# Patient Record
Sex: MALE | URBAN - METROPOLITAN AREA
[De-identification: names, ages, dates, MRNs, and addresses within clinical notes are randomized per-mention and may not be internally consistent; named-entity substitution may affect disease eponyms.]

---

## 2019-07-29 ENCOUNTER — OFFICE VISIT (OUTPATIENT)
Dept: FAMILY MEDICINE | Facility: CLINIC | Age: 44
End: 2019-07-29

## 2019-07-29 VITALS
RESPIRATION RATE: 20 BRPM | WEIGHT: 152 LBS | BODY MASS INDEX: 25.33 KG/M2 | HEART RATE: 78 BPM | TEMPERATURE: 97.9 F | OXYGEN SATURATION: 98 % | DIASTOLIC BLOOD PRESSURE: 100 MMHG | SYSTOLIC BLOOD PRESSURE: 158 MMHG | HEIGHT: 65 IN

## 2019-07-29 DIAGNOSIS — Z12.5 SCREENING FOR PROSTATE CANCER: Primary | ICD-10-CM

## 2019-07-29 DIAGNOSIS — R03.0 ELEVATED BLOOD PRESSURE READING WITHOUT DIAGNOSIS OF HYPERTENSION: ICD-10-CM

## 2019-07-29 PROCEDURE — 36415 COLL VENOUS BLD VENIPUNCTURE: CPT | Performed by: FAMILY MEDICINE

## 2019-07-29 PROCEDURE — 99203 OFFICE O/P NEW LOW 30 MIN: CPT | Performed by: FAMILY MEDICINE

## 2019-07-29 PROCEDURE — G0103 PSA SCREENING: HCPCS | Performed by: FAMILY MEDICINE

## 2019-07-29 SDOH — HEALTH STABILITY: MENTAL HEALTH: HOW OFTEN DO YOU HAVE A DRINK CONTAINING ALCOHOL?: NEVER

## 2019-07-29 ASSESSMENT — MIFFLIN-ST. JEOR: SCORE: 1501.35

## 2019-07-29 NOTE — PATIENT INSTRUCTIONS
The patient would like his labs   sent by email to himself and to the [email or web address] site.  Emails are:[email or web address] and [email or web address].    Patient was instructed on salt restriction.  He will get his blood pressure checked again in 1 month.  He had it checked at [facility] and [employer] in the last few days and both of those were slightly elevated also.    Patient is just visiting.  His wife is here on a permanent visa.  He comes over numerous times just for visits.  [place] on [place].

## 2019-07-29 NOTE — PROGRESS NOTES
"Subjective     Arturo Mireles is a 44 year old male who presents to clinic today for the following health issues:    HPI   New Patient/Transfer of Care, and requesting PSA test today.        There is no problem list on file for this patient.    History reviewed. No pertinent surgical history.    Social History     Tobacco Use     Smoking status: Never Smoker     Smokeless tobacco: Never Used   Substance Use Topics     Alcohol use: Never     Frequency: Never     Family History   Problem Relation Age of Onset     Arthritis Mother      Diabetes Father              Reviewed and updated as needed this visit by Provider  Tobacco         Review of Systems   ROS COMP: Constitutional, HEENT, cardiovascular, pulmonary, gi and gu systems are negative, except as otherwise noted.      Objective    BP (!) (P) 140/100   Pulse 78   Temp 97.9  F (36.6  C)   Resp 20   Ht 1.651 m (5' 5\")   Wt 68.9 kg (152 lb)   SpO2 98%   BMI 25.29 kg/m    Body mass index is 25.29 kg/m .  Physical Exam   GENERAL APPEARANCE: healthy, alert and no distress            Assessment & Plan       ICD-10-CM    1. Screening for prostate cancer Z12.5 Prostate spec antigen screen     Prostate spec antigen screen   2. Elevated blood pressure reading without diagnosis of hypertension R03.0         BMI:   Estimated body mass index is 25.29 kg/m  as calculated from the following:    Height as of this encounter: 1.651 m (5' 5\").    Weight as of this encounter: 68.9 kg (152 lb).           Patient Instructions   The patient would like his labs   sent by email to himself and to the [email or web address] site.  Emails are:[email or web address].    Patient was instructed on salt restriction.  He will get his blood pressure checked again in 1 month.  He had it checked at [facility] and [facility] in the last few days and both of those were slightly elevated also.    Patient is just visiting.  His wife is here in a permanent visa.  He comes " over numerous times just for visits.  [place] on [place].      Return in about 4 weeks (around 8/26/2019) for elevated blood pressure without diagnosis of HTN.    Phoenix García MD  [facility]

## 2019-07-30 LAB — PSA SERPL-ACNC: 0.72 UG/L (ref 0–4)

## 2019-08-01 ENCOUNTER — TELEPHONE (OUTPATIENT)
Dept: FAMILY MEDICINE | Facility: CLINIC | Age: 44
End: 2019-08-01

## 2019-08-01 NOTE — TELEPHONE ENCOUNTER
[employer] message sent to patient with [facility] contact info. He will need to reach out to that department to send his records.       [employer] message:  Johnny Morris,   We received your request to fax your lab results to your insurance company. You will need to request this be completed through medical records in [place]. I will provide you with this information below:    Release of Information, [address]   [address] [address]   Phone: [phone]   Fax: [phone]    Please let us know if there are any further questions. Thank you.   [facility]

## 2019-08-01 NOTE — TELEPHONE ENCOUNTER
Reason for Call:  Other results    Detailed comments: Needs lab results faxed to insurance. Please fax to [phone]. If any questions call patient    Phone Number Patient can be reached at: Home number on file [phone] (home)    Best Time: any    Can we leave a detailed message on this number? YES    Call taken on 8/1/2019 at 11:45 AM by DENISE BRANTLEY

## 2020-03-11 ENCOUNTER — HEALTH MAINTENANCE LETTER (OUTPATIENT)
Age: 45
End: 2020-03-11

## 2021-01-03 ENCOUNTER — HEALTH MAINTENANCE LETTER (OUTPATIENT)
Age: 46
End: 2021-01-03

## 2021-04-25 ENCOUNTER — HEALTH MAINTENANCE LETTER (OUTPATIENT)
Age: 46
End: 2021-04-25

## 2021-10-10 ENCOUNTER — HEALTH MAINTENANCE LETTER (OUTPATIENT)
Age: 46
End: 2021-10-10

## 2022-05-21 ENCOUNTER — HEALTH MAINTENANCE LETTER (OUTPATIENT)
Age: 47
End: 2022-05-21

## 2022-09-18 ENCOUNTER — HEALTH MAINTENANCE LETTER (OUTPATIENT)
Age: 47
End: 2022-09-18

## 2023-06-04 ENCOUNTER — HEALTH MAINTENANCE LETTER (OUTPATIENT)
Age: 48
End: 2023-06-04